# Patient Record
Sex: FEMALE | Race: WHITE | Employment: UNEMPLOYED | URBAN - METROPOLITAN AREA
[De-identification: names, ages, dates, MRNs, and addresses within clinical notes are randomized per-mention and may not be internally consistent; named-entity substitution may affect disease eponyms.]

---

## 2017-08-06 ENCOUNTER — HOSPITAL ENCOUNTER (EMERGENCY)
Facility: HOSPITAL | Age: 32
Discharge: HOME | End: 2017-08-06
Attending: EMERGENCY MEDICINE
Payer: MEDICAID

## 2017-08-06 VITALS
RESPIRATION RATE: 20 BRPM | WEIGHT: 250 LBS | HEIGHT: 65 IN | DIASTOLIC BLOOD PRESSURE: 85 MMHG | HEART RATE: 79 BPM | TEMPERATURE: 98 F | BODY MASS INDEX: 41.65 KG/M2 | OXYGEN SATURATION: 97 % | SYSTOLIC BLOOD PRESSURE: 131 MMHG

## 2017-08-06 DIAGNOSIS — M54.16 LUMBAR RADICULOPATHY: ICD-10-CM

## 2017-08-06 DIAGNOSIS — M54.9 BACK PAIN WITH RADIATION: Primary | ICD-10-CM

## 2017-08-06 LAB
ANION GAP SERPL CALC-SCNC: 8 MMOL/L (ref 0–18)
B-HCG UR QL: NEGATIVE
BASOPHILS # BLD: 0.1 K/UL (ref 0–0.2)
BASOPHILS NFR BLD: 1 %
BILIRUB UR QL: NEGATIVE
BUN SERPL-MCNC: 16 MG/DL (ref 8–20)
BUN/CREAT SERPL: 23.9 (ref 10–20)
CALCIUM SERPL-MCNC: 9.3 MG/DL (ref 8.5–10.5)
CHLORIDE SERPL-SCNC: 104 MMOL/L (ref 95–110)
CO2 SERPL-SCNC: 25 MMOL/L (ref 22–32)
COLOR UR: YELLOW
CREAT SERPL-MCNC: 0.67 MG/DL (ref 0.5–1.5)
EOSINOPHIL # BLD: 0.1 K/UL (ref 0–0.7)
EOSINOPHIL NFR BLD: 1 %
ERYTHROCYTE [DISTWIDTH] IN BLOOD BY AUTOMATED COUNT: 14.8 % (ref 11–15)
GLUCOSE SERPL-MCNC: 96 MG/DL (ref 70–99)
GLUCOSE UR-MCNC: NEGATIVE MG/DL
HCT VFR BLD AUTO: 39 % (ref 35–48)
HGB BLD-MCNC: 12.6 G/DL (ref 12–16)
HGB UR QL STRIP.AUTO: NEGATIVE
KETONES UR-MCNC: NEGATIVE MG/DL
LYMPHOCYTES # BLD: 2.6 K/UL (ref 1–4)
LYMPHOCYTES NFR BLD: 23 %
MCH RBC QN AUTO: 25.8 PG (ref 27–32)
MCHC RBC AUTO-ENTMCNC: 32.3 G/DL (ref 32–37)
MCV RBC AUTO: 79.8 FL (ref 80–100)
MONOCYTES # BLD: 0.5 K/UL (ref 0–1)
MONOCYTES NFR BLD: 4 %
NEUTROPHILS # BLD AUTO: 8 K/UL (ref 1.8–7.7)
NEUTROPHILS NFR BLD: 70 %
NITRITE UR QL STRIP.AUTO: NEGATIVE
OSMOLALITY UR CALC.SUM OF ELEC: 285 MOSM/KG (ref 275–295)
PH UR: 6 [PH] (ref 5–8)
PLATELET # BLD AUTO: 304 K/UL (ref 140–400)
PMV BLD AUTO: 8.5 FL (ref 7.4–10.3)
POTASSIUM SERPL-SCNC: 4 MMOL/L (ref 3.3–5.1)
PROT UR-MCNC: NEGATIVE MG/DL
RBC # BLD AUTO: 4.89 M/UL (ref 3.7–5.4)
RBC #/AREA URNS AUTO: 0 /HPF
SODIUM SERPL-SCNC: 137 MMOL/L (ref 136–144)
SP GR UR STRIP: 1.02 (ref 1–1.03)
UROBILINOGEN UR STRIP-ACNC: <2
VIT C UR-MCNC: NEGATIVE MG/DL
WBC # BLD AUTO: 11.5 K/UL (ref 4–11)
WBC #/AREA URNS AUTO: 16 /HPF

## 2017-08-06 PROCEDURE — 85025 COMPLETE CBC W/AUTO DIFF WBC: CPT | Performed by: EMERGENCY MEDICINE

## 2017-08-06 PROCEDURE — 87147 CULTURE TYPE IMMUNOLOGIC: CPT | Performed by: EMERGENCY MEDICINE

## 2017-08-06 PROCEDURE — 96372 THER/PROPH/DIAG INJ SC/IM: CPT

## 2017-08-06 PROCEDURE — 80048 BASIC METABOLIC PNL TOTAL CA: CPT | Performed by: EMERGENCY MEDICINE

## 2017-08-06 PROCEDURE — 99283 EMERGENCY DEPT VISIT LOW MDM: CPT

## 2017-08-06 PROCEDURE — 81001 URINALYSIS AUTO W/SCOPE: CPT | Performed by: EMERGENCY MEDICINE

## 2017-08-06 PROCEDURE — 81025 URINE PREGNANCY TEST: CPT

## 2017-08-06 PROCEDURE — 36415 COLL VENOUS BLD VENIPUNCTURE: CPT

## 2017-08-06 PROCEDURE — 87086 URINE CULTURE/COLONY COUNT: CPT | Performed by: EMERGENCY MEDICINE

## 2017-08-06 RX ORDER — HYDROCODONE BITARTRATE AND ACETAMINOPHEN 5; 325 MG/1; MG/1
1-2 TABLET ORAL EVERY 6 HOURS PRN
Qty: 20 TABLET | Refills: 0 | Status: SHIPPED | OUTPATIENT
Start: 2017-08-06 | End: 2017-08-13

## 2017-08-06 RX ORDER — IBUPROFEN 600 MG/1
600 TABLET ORAL EVERY 6 HOURS PRN
Qty: 30 TABLET | Refills: 0 | Status: SHIPPED | OUTPATIENT
Start: 2017-08-06 | End: 2017-08-13

## 2017-08-06 RX ORDER — DIAZEPAM 5 MG/1
5 TABLET ORAL EVERY 6 HOURS PRN
Qty: 20 TABLET | Refills: 0 | Status: SHIPPED | OUTPATIENT
Start: 2017-08-06 | End: 2017-08-13

## 2017-08-06 RX ORDER — KETOROLAC TROMETHAMINE 30 MG/ML
60 INJECTION, SOLUTION INTRAMUSCULAR; INTRAVENOUS ONCE
Status: COMPLETED | OUTPATIENT
Start: 2017-08-06 | End: 2017-08-06

## 2017-08-06 RX ORDER — MORPHINE SULFATE 4 MG/ML
4 INJECTION, SOLUTION INTRAMUSCULAR; INTRAVENOUS ONCE
Status: DISCONTINUED | OUTPATIENT
Start: 2017-08-06 | End: 2017-08-06

## 2017-08-15 NOTE — ED PROVIDER NOTES
Patient Seen in: [facility] AND [facility] Emergency Department    History   No chief complaint on file. Stated Complaint: flank pain     HPI  58-year-old female presents emergency department with right-sided abdominal pain for the last 3 days.   No dysuria or There is no tenderness. There is no rebound and no guarding. There is no reproducible abdominal tenderness to palpation. Musculoskeletal: Normal range of motion. She exhibits no edema or tenderness. There is right low lumbar tenderness to palpation. these tests on the individual orders.    RAINBOW DRAW GOLD   RAINBOW DRAW LAVENDER   RAINBOW DRAW LIGHT GREEN       ============================================================  ED Course  ------------------------------------------------------------  MDM

## 2018-07-29 ENCOUNTER — APPOINTMENT (OUTPATIENT)
Dept: ULTRASOUND IMAGING | Facility: HOSPITAL | Age: 33
End: 2018-07-29
Attending: EMERGENCY MEDICINE
Payer: MEDICAID

## 2018-07-29 ENCOUNTER — HOSPITAL ENCOUNTER (EMERGENCY)
Facility: HOSPITAL | Age: 33
Discharge: HOME | End: 2018-07-29
Attending: EMERGENCY MEDICINE
Payer: MEDICAID

## 2018-07-29 VITALS
DIASTOLIC BLOOD PRESSURE: 81 MMHG | WEIGHT: 246.69 LBS | SYSTOLIC BLOOD PRESSURE: 133 MMHG | TEMPERATURE: 99 F | BODY MASS INDEX: 41.1 KG/M2 | RESPIRATION RATE: 18 BRPM | OXYGEN SATURATION: 99 % | HEART RATE: 76 BPM | HEIGHT: 65 IN

## 2018-07-29 DIAGNOSIS — R10.13 ABDOMINAL PAIN, EPIGASTRIC: Primary | ICD-10-CM

## 2018-07-29 LAB
ALBUMIN SERPL BCP-MCNC: 3.6 G/DL (ref 3.5–4.8)
ALBUMIN/GLOB SERPL: 1.1 {RATIO} (ref 1–2)
ALP SERPL-CCNC: 64 U/L (ref 32–100)
ALT SERPL-CCNC: 18 U/L (ref 14–54)
ANION GAP SERPL CALC-SCNC: 6 MMOL/L (ref 0–18)
AST SERPL-CCNC: 20 U/L (ref 15–41)
B-HCG UR QL: NEGATIVE
BASOPHILS # BLD: 0.1 K/UL (ref 0–0.2)
BASOPHILS NFR BLD: 1 %
BILIRUB SERPL-MCNC: <0.1 MG/DL (ref 0.3–1.2)
BILIRUB UR QL: NEGATIVE
BUN SERPL-MCNC: 21 MG/DL (ref 8–20)
BUN/CREAT SERPL: 23.3 (ref 10–20)
CALCIUM SERPL-MCNC: 8.9 MG/DL (ref 8.5–10.5)
CHLORIDE SERPL-SCNC: 104 MMOL/L (ref 95–110)
CLARITY UR: CLEAR
CO2 SERPL-SCNC: 25 MMOL/L (ref 22–32)
COLOR UR: YELLOW
CREAT SERPL-MCNC: 0.9 MG/DL (ref 0.5–1.5)
EOSINOPHIL # BLD: 0.2 K/UL (ref 0–0.7)
EOSINOPHIL NFR BLD: 2 %
ERYTHROCYTE [DISTWIDTH] IN BLOOD BY AUTOMATED COUNT: 14.8 % (ref 11–15)
GLOBULIN PLAS-MCNC: 3.3 G/DL (ref 2.5–3.7)
GLUCOSE SERPL-MCNC: 81 MG/DL (ref 70–99)
GLUCOSE UR-MCNC: NEGATIVE MG/DL
HCT VFR BLD AUTO: 37.7 % (ref 35–48)
HGB BLD-MCNC: 12 G/DL (ref 12–16)
HGB UR QL STRIP.AUTO: NEGATIVE
KETONES UR-MCNC: NEGATIVE MG/DL
LIPASE SERPL-CCNC: 41 U/L (ref 22–51)
LYMPHOCYTES # BLD: 2.9 K/UL (ref 1–4)
LYMPHOCYTES NFR BLD: 28 %
MCH RBC QN AUTO: 26.1 PG (ref 27–32)
MCHC RBC AUTO-ENTMCNC: 31.9 G/DL (ref 32–37)
MCV RBC AUTO: 82 FL (ref 80–100)
MONOCYTES # BLD: 0.7 K/UL (ref 0–1)
MONOCYTES NFR BLD: 7 %
NEUTROPHILS # BLD AUTO: 6.4 K/UL (ref 1.8–7.7)
NEUTROPHILS NFR BLD: 62 %
NITRITE UR QL STRIP.AUTO: NEGATIVE
OSMOLALITY UR CALC.SUM OF ELEC: 282 MOSM/KG (ref 275–295)
PH UR: 5 [PH] (ref 5–8)
PLATELET # BLD AUTO: 262 K/UL (ref 140–400)
PMV BLD AUTO: 8 FL (ref 7.4–10.3)
POTASSIUM SERPL-SCNC: 3.8 MMOL/L (ref 3.3–5.1)
PROT SERPL-MCNC: 6.9 G/DL (ref 5.9–8.4)
PROT UR-MCNC: NEGATIVE MG/DL
RBC # BLD AUTO: 4.6 M/UL (ref 3.7–5.4)
RBC #/AREA URNS AUTO: 1 /HPF
SODIUM SERPL-SCNC: 135 MMOL/L (ref 136–144)
SP GR UR STRIP: 1.01 (ref 1–1.03)
UROBILINOGEN UR STRIP-ACNC: <2
VIT C UR-MCNC: NEGATIVE MG/DL
WBC # BLD AUTO: 10.3 K/UL (ref 4–11)
WBC #/AREA URNS AUTO: 2 /HPF

## 2018-07-29 PROCEDURE — 83690 ASSAY OF LIPASE: CPT | Performed by: EMERGENCY MEDICINE

## 2018-07-29 PROCEDURE — 80053 COMPREHEN METABOLIC PANEL: CPT | Performed by: EMERGENCY MEDICINE

## 2018-07-29 PROCEDURE — 85025 COMPLETE CBC W/AUTO DIFF WBC: CPT | Performed by: EMERGENCY MEDICINE

## 2018-07-29 PROCEDURE — 36415 COLL VENOUS BLD VENIPUNCTURE: CPT

## 2018-07-29 PROCEDURE — 81001 URINALYSIS AUTO W/SCOPE: CPT | Performed by: EMERGENCY MEDICINE

## 2018-07-29 PROCEDURE — 81025 URINE PREGNANCY TEST: CPT

## 2018-07-29 PROCEDURE — 99284 EMERGENCY DEPT VISIT MOD MDM: CPT

## 2018-07-29 PROCEDURE — 76705 ECHO EXAM OF ABDOMEN: CPT | Performed by: EMERGENCY MEDICINE

## 2018-07-29 RX ORDER — MAGNESIUM HYDROXIDE/ALUMINUM HYDROXICE/SIMETHICONE 120; 1200; 1200 MG/30ML; MG/30ML; MG/30ML
30 SUSPENSION ORAL ONCE
Status: COMPLETED | OUTPATIENT
Start: 2018-07-29 | End: 2018-07-29

## 2018-07-29 RX ORDER — PANTOPRAZOLE SODIUM 40 MG/1
40 TABLET, DELAYED RELEASE ORAL DAILY
Qty: 30 TABLET | Refills: 1 | Status: SHIPPED | OUTPATIENT
Start: 2018-07-29 | End: 2018-08-28

## 2018-07-29 RX ORDER — SUCRALFATE 1 G/1
1 TABLET ORAL
Qty: 40 TABLET | Refills: 0 | Status: SHIPPED | OUTPATIENT
Start: 2018-07-29 | End: 2018-08-08
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
## 2019-01-28 ENCOUNTER — HOSPITAL (OUTPATIENT)
Dept: OTHER | Age: 34
End: 2019-01-28
Attending: FAMILY MEDICINE

## 2019-02-01 ENCOUNTER — HOSPITAL (OUTPATIENT)
Dept: OTHER | Age: 34
End: 2019-02-01

## 2019-02-01 ENCOUNTER — HOSPITAL (OUTPATIENT)
Dept: OTHER | Age: 34
End: 2019-02-01
Attending: FAMILY MEDICINE

## 2019-02-02 LAB — PATHOLOGIST NAME: NORMAL

## 2019-06-15 ENCOUNTER — HOSPITAL ENCOUNTER (EMERGENCY)
Facility: HOSPITAL | Age: 34
Discharge: HOME | End: 2019-06-15
Attending: PHYSICIAN ASSISTANT
Payer: MEDICAID

## 2019-06-15 ENCOUNTER — APPOINTMENT (OUTPATIENT)
Dept: CT IMAGING | Facility: HOSPITAL | Age: 34
End: 2019-06-15
Attending: PHYSICIAN ASSISTANT
Payer: MEDICAID

## 2019-06-15 VITALS
HEIGHT: 65 IN | BODY MASS INDEX: 38.32 KG/M2 | DIASTOLIC BLOOD PRESSURE: 85 MMHG | TEMPERATURE: 98 F | OXYGEN SATURATION: 98 % | RESPIRATION RATE: 20 BRPM | WEIGHT: 230 LBS | SYSTOLIC BLOOD PRESSURE: 143 MMHG | HEART RATE: 65 BPM

## 2019-06-15 DIAGNOSIS — R10.9 BILATERAL FLANK PAIN: ICD-10-CM

## 2019-06-15 DIAGNOSIS — R10.9 ABDOMINAL PAIN, ACUTE: Primary | ICD-10-CM

## 2019-06-15 DIAGNOSIS — M54.50 ACUTE BILATERAL LOW BACK PAIN WITHOUT SCIATICA: ICD-10-CM

## 2019-06-15 PROCEDURE — 81003 URINALYSIS AUTO W/O SCOPE: CPT | Performed by: PHYSICIAN ASSISTANT

## 2019-06-15 PROCEDURE — 80048 BASIC METABOLIC PNL TOTAL CA: CPT | Performed by: PHYSICIAN ASSISTANT

## 2019-06-15 PROCEDURE — 96361 HYDRATE IV INFUSION ADD-ON: CPT

## 2019-06-15 PROCEDURE — 96375 TX/PRO/DX INJ NEW DRUG ADDON: CPT

## 2019-06-15 PROCEDURE — 74177 CT ABD & PELVIS W/CONTRAST: CPT | Performed by: PHYSICIAN ASSISTANT

## 2019-06-15 PROCEDURE — 83690 ASSAY OF LIPASE: CPT | Performed by: PHYSICIAN ASSISTANT

## 2019-06-15 PROCEDURE — 80076 HEPATIC FUNCTION PANEL: CPT | Performed by: PHYSICIAN ASSISTANT

## 2019-06-15 PROCEDURE — 81025 URINE PREGNANCY TEST: CPT

## 2019-06-15 PROCEDURE — 99284 EMERGENCY DEPT VISIT MOD MDM: CPT

## 2019-06-15 PROCEDURE — 85025 COMPLETE CBC W/AUTO DIFF WBC: CPT | Performed by: PHYSICIAN ASSISTANT

## 2019-06-15 PROCEDURE — 96374 THER/PROPH/DIAG INJ IV PUSH: CPT

## 2019-06-15 RX ORDER — FAMOTIDINE 20 MG/1
20 TABLET ORAL 2 TIMES DAILY
Qty: 28 TABLET | Refills: 0 | Status: SHIPPED | OUTPATIENT
Start: 2019-06-15 | End: 2019-06-29

## 2019-06-15 RX ORDER — ONDANSETRON 2 MG/ML
4 INJECTION INTRAMUSCULAR; INTRAVENOUS ONCE
Status: COMPLETED | OUTPATIENT
Start: 2019-06-15 | End: 2019-06-15

## 2019-06-15 RX ORDER — CYCLOBENZAPRINE HCL 10 MG
10 TABLET ORAL 3 TIMES DAILY PRN
Qty: 14 TABLET | Refills: 0 | Status: SHIPPED | OUTPATIENT
Start: 2019-06-15 | End: 2019-06-22

## 2019-06-15 RX ORDER — KETOROLAC TROMETHAMINE 30 MG/ML
30 INJECTION, SOLUTION INTRAMUSCULAR; INTRAVENOUS ONCE
Status: COMPLETED | OUTPATIENT
Start: 2019-06-15 | End: 2019-06-15

## 2019-06-15 RX ORDER — IBUPROFEN 600 MG/1
TABLET ORAL
Qty: 20 TABLET | Refills: 0 | Status: SHIPPED | OUTPATIENT
Start: 2019-06-15

## 2019-06-15 NOTE — ED PROVIDER NOTES
Patient Seen in: [facility] AND [facility] Emergency Department    History   Patient presents with:  Abdomen/Flank Pain (GI/)    Stated Complaint: side pain/back pain.     EDE Clements is a 26year old female who presents with chief complaint of bilateral SpO2 100 %   O2 Device None (Room air)       Current:/85   Pulse 65   Temp 98 °F (36.7 °C) (Tympanic)   Resp 20   Ht 165.1 cm (5' 5\")   Wt 104.3 kg   LMP 06/09/2019   SpO2 98%   BMI 38.27 kg/m²     PULSE OX within normal limits on room air as inte dry.  No obvious bruising. No obvious rash.           ED Course     Labs Reviewed   HEPATIC FUNCTION PANEL (7) - Abnormal; Notable for the following components:       Result Value    AST 9 (*)     All other components within normal limits   CBC W/ DIFFEREN 48263  [phone]    Schedule an appointment as soon as possible for a visit in 2 days  For follow-up    We recommend that you schedule follow up care with a primary care provider within the next three months to obtain basic health screening including re

## 2019-11-14 ENCOUNTER — HOSPITAL (OUTPATIENT)
Dept: OTHER | Age: 34
End: 2019-11-14
Attending: FAMILY MEDICINE

## 2019-12-04 ENCOUNTER — HOSPITAL (OUTPATIENT)
Dept: OTHER | Age: 34
End: 2019-12-04
Attending: INTERNAL MEDICINE

## 2020-03-21 ENCOUNTER — HOSPITAL (OUTPATIENT)
Dept: OTHER | Age: 35
End: 2020-03-21

## 2020-03-21 PROCEDURE — 99283 EMERGENCY DEPT VISIT LOW MDM: CPT | Performed by: EMERGENCY MEDICINE

## (undated) NOTE — ED AVS SNAPSHOT
Dorcas Castorena   MRN: [id number]    Department:  [facility] Emergency Department   Date of Visit:  8/6/2017           Disclosure     Insurance plans vary and the physician(s) referred by the ER may not be covered by your plan.  Please contact your CARE PHYSICIAN AT ONCE OR RETURN IMMEDIATELY TO THE EMERGENCY DEPARTMENT. If you have been prescribed any medication(s), please fill your prescription right away and begin taking the medication(s) as directed.   If you believe that any of the medications

## (undated) NOTE — ED AVS SNAPSHOT
Barbie Lee   MRN: [id number]    Department:  [facility] Emergency Department   Date of Visit:  7/29/2018           Disclosure     Insurance plans vary and the physician(s) referred by the ER may not be covered by your plan.  Please contact your CARE PHYSICIAN AT ONCE OR RETURN IMMEDIATELY TO THE EMERGENCY DEPARTMENT. If you have been prescribed any medication(s), please fill your prescription right away and begin taking the medication(s) as directed.   If you believe that any of the medications

## (undated) NOTE — ED AVS SNAPSHOT
Tiarra Yvonne   MRN: [id number]    Department:  [facility] Emergency Department   Date of Visit:  6/15/2019           Disclosure     Insurance plans vary and the physician(s) referred by the ER may not be covered by your plan.  Please contact your CARE PHYSICIAN AT ONCE OR RETURN IMMEDIATELY TO THE EMERGENCY DEPARTMENT. If you have been prescribed any medication(s), please fill your prescription right away and begin taking the medication(s) as directed.   If you believe that any of the medications